# Patient Record
Sex: FEMALE | Race: OTHER | NOT HISPANIC OR LATINO | URBAN - METROPOLITAN AREA
[De-identification: names, ages, dates, MRNs, and addresses within clinical notes are randomized per-mention and may not be internally consistent; named-entity substitution may affect disease eponyms.]

---

## 2022-10-03 ENCOUNTER — OUTPATIENT (OUTPATIENT)
Dept: OUTPATIENT SERVICES | Facility: HOSPITAL | Age: 77
LOS: 1 days | End: 2022-10-03

## 2022-10-03 ENCOUNTER — APPOINTMENT (OUTPATIENT)
Dept: OPHTHALMOLOGY | Facility: CLINIC | Age: 77
End: 2022-10-03

## 2022-10-04 DIAGNOSIS — H26.40 UNSPECIFIED SECONDARY CATARACT: ICD-10-CM

## 2022-10-04 DIAGNOSIS — H18.609 KERATOCONUS, UNSPECIFIED, UNSPECIFIED EYE: ICD-10-CM

## 2023-01-10 PROBLEM — Z00.00 ENCOUNTER FOR PREVENTIVE HEALTH EXAMINATION: Status: ACTIVE | Noted: 2023-01-10

## 2023-01-18 NOTE — ASU PATIENT PROFILE, ADULT - NSICDXPASTMEDICALHX_GEN_ALL_CORE_FT
PAST MEDICAL HISTORY:  GERD (gastroesophageal reflux disease)     Hypothyroidism     Osteoporosis

## 2023-01-19 ENCOUNTER — TRANSCRIPTION ENCOUNTER (OUTPATIENT)
Age: 78
End: 2023-01-19

## 2023-01-19 ENCOUNTER — OUTPATIENT (OUTPATIENT)
Dept: OUTPATIENT SERVICES | Facility: HOSPITAL | Age: 78
LOS: 1 days | Discharge: HOME | End: 2023-01-19

## 2023-01-19 VITALS
OXYGEN SATURATION: 96 % | HEART RATE: 59 BPM | DIASTOLIC BLOOD PRESSURE: 63 MMHG | TEMPERATURE: 97 F | RESPIRATION RATE: 14 BRPM | HEIGHT: 55 IN | SYSTOLIC BLOOD PRESSURE: 120 MMHG | WEIGHT: 104.72 LBS

## 2023-01-19 VITALS
HEART RATE: 55 BPM | DIASTOLIC BLOOD PRESSURE: 72 MMHG | SYSTOLIC BLOOD PRESSURE: 153 MMHG | TEMPERATURE: 98 F | RESPIRATION RATE: 16 BRPM | OXYGEN SATURATION: 98 %

## 2023-01-19 DEVICE — IMPLANTABLE DEVICE
Type: IMPLANTABLE DEVICE | Site: LEFT | Status: NON-FUNCTIONAL
Removed: 2023-01-19

## 2023-01-19 RX ORDER — ACETAMINOPHEN 500 MG
650 TABLET ORAL ONCE
Refills: 0 | Status: DISCONTINUED | OUTPATIENT
Start: 2023-01-19 | End: 2023-01-19

## 2023-01-19 RX ORDER — OFLOXACIN 0.3 %
1 DROPS OPHTHALMIC (EYE)
Refills: 0 | Status: COMPLETED | OUTPATIENT
Start: 2023-01-19 | End: 2023-01-19

## 2023-01-19 RX ORDER — TROPICAMIDE 1 %
1 DROPS OPHTHALMIC (EYE)
Refills: 0 | Status: COMPLETED | OUTPATIENT
Start: 2023-01-19 | End: 2023-01-19

## 2023-01-19 RX ORDER — KETOROLAC TROMETHAMINE 0.5 %
1 DROPS OPHTHALMIC (EYE)
Refills: 0 | Status: COMPLETED | OUTPATIENT
Start: 2023-01-19 | End: 2023-01-19

## 2023-01-19 RX ORDER — SODIUM CHLORIDE 9 MG/ML
1000 INJECTION, SOLUTION INTRAVENOUS
Refills: 0 | Status: DISCONTINUED | OUTPATIENT
Start: 2023-01-19 | End: 2023-01-19

## 2023-01-19 RX ORDER — PHENYLEPHRINE HCL 2.5 %
1 DROPS OPHTHALMIC (EYE)
Refills: 0 | Status: COMPLETED | OUTPATIENT
Start: 2023-01-19 | End: 2023-01-19

## 2023-01-19 RX ORDER — ATROPINE SULFATE 1 %
1 DROPS OPHTHALMIC (EYE)
Refills: 0 | Status: COMPLETED | OUTPATIENT
Start: 2023-01-19 | End: 2023-01-19

## 2023-01-19 RX ADMIN — Medication 1 DROP(S): at 12:30

## 2023-01-19 RX ADMIN — Medication 1 DROP(S): at 12:25

## 2023-01-19 RX ADMIN — Medication 1 DROP(S): at 12:35

## 2023-01-19 NOTE — OPERATIVE REPORT - OPERATIVE RPOSRT DETAILS
PRE-OP DIAGNOSIS: Cataract__left____ EYE    POST-OP DIAGNOSIS: SAME    ANESTHESIA: MAC    PROCEDURE: Cataract____left__ EYE    SPECIMEN/TISSUE REMOVED: None    ESTIMATED BLOOD LOSS: < 1mL    COMPLICATIONS: None    The patient was brought into the operating room, where an intravenous line was inserted.  The patient was then prepped and draped in the usual sterile fashion for eye surgery of the operated eye.  The lid speculum was inserted into the operated eye.     A paracentesis was performed using a fifteen degree blade.  One percent preservative free lidocaine was injected into the anterior chamber.  Trypan blue was injected into the anterior chamber and irrigated out with balanced salt solution.  The anterior chamber was filled with Viscoat. A 2.75 mm keratome was used to make a clear corneal incision.  The cytotome and Utrata forceps were used to make a continuous curvilinear capsulorrhexis.  The lens was hydrodissected and hydrodelineated with balanced salt solution, until it was freely movable.   The phacoemulsification handpiece was used to groove the lens nucleus into four quadrants, which were then removed.  The remaining cortex was removed using irrigation and aspiration.  The anterior chamber and capsular bag were filled with Healon, and the posterior capsule was noted to be clear and intact.    An Melecio lens model SA60AT power ____18.0__ was injected into the capsular bag without complications.  The lens was centered with a Sinsky hook. The remaining Healon was removed with irrigation and aspiration on the viscoelastic setting.  Miochol was injected into the anterior chamber to constrict the pupil and pull the iris from the corneal wounds.  The anterior chamber was maintained with balanced salt solution and the corneal wounds were hydrated, and noted to be tight and secure.  The lid speculum was removed from the eye.  The patient received topical Vigamox and pred forte eye drops.  The patient also received Tobradex eye ointment, and the eye was patched and shielded.  The patient was brought to the recovery room in stable condition, alert and oriented times three.  The patient was reminded to follow up in the office the next day.

## 2023-01-19 NOTE — ASU DISCHARGE PLAN (ADULT/PEDIATRIC) - NS MD DC FALL RISK RISK
For information on Fall & Injury Prevention, visit: [email or web address] OR  [email or web address] OR  https://www.cdc.gov/steadi/patient.html

## 2023-01-24 RX ORDER — PHENYLEPHRINE HCL 2.5 %
1 DROPS OPHTHALMIC (EYE)
Refills: 0 | Status: DISCONTINUED | OUTPATIENT
Start: 2023-01-26 | End: 2023-01-26

## 2023-01-24 RX ORDER — KETOROLAC TROMETHAMINE 0.5 %
1 DROPS OPHTHALMIC (EYE)
Refills: 0 | Status: DISCONTINUED | OUTPATIENT
Start: 2023-01-26 | End: 2023-01-26

## 2023-01-24 RX ORDER — TROPICAMIDE 1 %
1 DROPS OPHTHALMIC (EYE)
Refills: 0 | Status: DISCONTINUED | OUTPATIENT
Start: 2023-01-26 | End: 2023-01-26

## 2023-01-24 RX ORDER — ATROPINE SULFATE 1 %
1 DROPS OPHTHALMIC (EYE)
Refills: 0 | Status: DISCONTINUED | OUTPATIENT
Start: 2023-01-26 | End: 2023-01-26

## 2023-01-24 RX ORDER — OFLOXACIN 0.3 %
1 DROPS OPHTHALMIC (EYE)
Refills: 0 | Status: DISCONTINUED | OUTPATIENT
Start: 2023-01-26 | End: 2023-01-26

## 2023-01-25 NOTE — ASU PATIENT PROFILE, ADULT - AS SC BRADEN NUTRITION
Post Operative Day #: 1    SUBJECTIVE: 50y F s/p laparoscopic inguinal hernia with mesh 7/9    INTERVAL HPI/OVERNIGHT EVENTS:    States feels well. States has mild but tolerable abdominal pain. mild nausea no vomiting  No other complaints.     MEDICATIONS  (STANDING):  heparin   Injectable 5000 Unit(s) SubCutaneous every 8 hours    MEDICATIONS  (PRN):  acetaminophen   Tablet .. 650 milliGRAM(s) Oral every 6 hours PRN Temp greater or equal to 38C (100.4F), Mild Pain (1 - 3)  acetaminophen   Tablet .. 650 milliGRAM(s) Oral every 6 hours PRN Mild Pain (1 - 3)  ketorolac   Injectable 30 milliGRAM(s) IV Push every 6 hours PRN Moderate Pain (4 - 6)  morphine  - Injectable 2 milliGRAM(s) IV Push every 6 hours PRN Severe Pain (7 - 10)  ondansetron Injectable 4 milliGRAM(s) IV Push every 6 hours PRN Nausea      OBJECTIVE:  Vital Signs Last 24 Hrs  T(C): 36.9 (10 Jul 2020 06:12), Max: 37 (10 Jul 2020 01:25)  T(F): 98.5 (10 Jul 2020 06:12), Max: 98.6 (10 Jul 2020 01:25)  HR: 80 (10 Jul 2020 07:44) (70 - 87)  BP: 136/83 (10 Jul 2020 07:44) (122/78 - 148/86)  BP(mean): 93 (10 Jul 2020 02:10) (89 - 96)  RR: 16 (10 Jul 2020 06:12) (12 - 18)  SpO2: 100% (10 Jul 2020 06:12) (96% - 100%)    Physical Exam:    Gen: awake, alert oriented NAD  HEENT: anicteric  Abdomen: surgical wounds dressed c/d/i, mildly distended, soft, incisional tenderness  Extremities: warm to touch no c/c/e            I&O's Detail    09 Jul 2020 07:01  -  10 Jul 2020 07:00  --------------------------------------------------------  IN:    Lactated Ringers IV Bolus: 2100 mL    lactated ringers.: 150 mL  Total IN: 2250 mL    OUT:    Estimated Blood Loss: 20 mL    Voided: 200 mL  Total OUT: 220 mL    Total NET: 2030 mL          Labs:                          12.7   9.30  )-----------( 186      ( 09 Jul 2020 12:40 )             39.1     07-09    140  |  106  |  7   ----------------------------<  87  4.7   |  26  |  0.59    Ca    8.6      09 Jul 2020 12:40    TPro  7.8  /  Alb  3.9  /  TBili  0.4  /  DBili  x   /  AST  15  /  ALT  18  /  AlkPhos  54  07-09    PT/INR - ( 09 Jul 2020 20:44 )   PT: 12.9 sec;   INR: 1.11 ratio         PTT - ( 09 Jul 2020 20:44 )  PTT:29.3 sec
(4) excellent

## 2023-01-26 ENCOUNTER — TRANSCRIPTION ENCOUNTER (OUTPATIENT)
Age: 78
End: 2023-01-26

## 2023-01-26 ENCOUNTER — OUTPATIENT (OUTPATIENT)
Dept: OUTPATIENT SERVICES | Facility: HOSPITAL | Age: 78
LOS: 1 days | Discharge: HOME | End: 2023-01-26

## 2023-01-26 VITALS
DIASTOLIC BLOOD PRESSURE: 65 MMHG | SYSTOLIC BLOOD PRESSURE: 142 MMHG | RESPIRATION RATE: 17 BRPM | HEART RATE: 52 BPM | TEMPERATURE: 98 F | OXYGEN SATURATION: 98 %

## 2023-01-26 VITALS
SYSTOLIC BLOOD PRESSURE: 118 MMHG | DIASTOLIC BLOOD PRESSURE: 67 MMHG | HEART RATE: 61 BPM | HEIGHT: 55 IN | RESPIRATION RATE: 16 BRPM | OXYGEN SATURATION: 97 % | TEMPERATURE: 98 F | WEIGHT: 101.41 LBS

## 2023-01-26 RX ORDER — ACETAMINOPHEN 500 MG
650 TABLET ORAL ONCE
Refills: 0 | Status: DISCONTINUED | OUTPATIENT
Start: 2023-01-26 | End: 2023-01-26

## 2023-01-26 RX ORDER — SODIUM CHLORIDE 9 MG/ML
1000 INJECTION, SOLUTION INTRAVENOUS
Refills: 0 | Status: DISCONTINUED | OUTPATIENT
Start: 2023-01-26 | End: 2023-01-26

## 2023-01-26 RX ORDER — ONDANSETRON 8 MG/1
4 TABLET, FILM COATED ORAL ONCE
Refills: 0 | Status: DISCONTINUED | OUTPATIENT
Start: 2023-01-26 | End: 2023-01-26

## 2023-01-26 RX ADMIN — Medication 1 DROP(S): at 14:02

## 2023-01-26 RX ADMIN — Medication 1 DROP(S): at 14:15

## 2023-01-26 RX ADMIN — Medication 1 DROP(S): at 14:00

## 2023-01-26 NOTE — ASU DISCHARGE PLAN (ADULT/PEDIATRIC) - NS MD DC FALL RISK RISK
For information on Fall & Injury Prevention, visit: [email or web address] OR  [email or web address] OR  https://www.cdc.gov/steadi/patient.html

## 2023-06-08 ENCOUNTER — APPOINTMENT (OUTPATIENT)
Dept: OPHTHALMOLOGY | Facility: CLINIC | Age: 78
End: 2023-06-08

## 2023-06-08 ENCOUNTER — OUTPATIENT (OUTPATIENT)
Dept: OUTPATIENT SERVICES | Facility: HOSPITAL | Age: 78
LOS: 1 days | End: 2023-06-08

## 2023-06-13 DIAGNOSIS — H26.491 OTHER SECONDARY CATARACT, RIGHT EYE: ICD-10-CM

## 2023-06-26 NOTE — OPERATIVE REPORT - OPERATIVE RPOSRT DETAILS
PRE-OP DIAGNOSIS: Cataract_right___ EYE    POST-OP DIAGNOSIS: SAME    ANESTHESIA: MAC    PROCEDURE: Cataract___right  EYE    SPECIMEN/TISSUE REMOVED: None    ESTIMATED BLOOD LOSS: < 1mL    COMPLICATIONS: None    The patient was brought into the operating room, where an intravenous line was inserted.  The patient was then prepped and draped in the usual sterile fashion for eye surgery of the operated eye.  The lid speculum was inserted into the operated eye.     A paracentesis was performed using a fifteen degree blade.  One percent preservative free lidocaine was injected into the anterior chamber.   Trypan blue was injected into the anterior chamber and irrigated out with balanced salt solution.  The anterior chamber was filled with Viscoat. A 2.75 mm keratome was used to make a clear corneal incision.  The cytotome and Utrata forceps were used to make a continuous curvilinear capsulorrhexis.  The lens was hydrodissected and hydrodelineated with balanced salt solution, until it was freely movable.   The phacoemulsification handpiece was used to groove the lens nucleus into four quadrants, which were then removed.  The remaining cortex was removed using irrigation and aspiration.  The anterior chamber and capsular bag were filled with Healon, and the posterior capsule was noted to be clear and intact.    An Melecio lens model SA60AT power ____18.5__ was injected into the capsular bag without complications.  The lens was centered with a Sinsky hook. The remaining Healon was removed with irrigation and aspiration on the viscoelastic setting.  Miochol was injected into the anterior chamber to constrict the pupil and pull the iris from the corneal wounds.  The anterior chamber was maintained with balanced salt solution and the corneal wounds were hydrated, and noted to be tight and secure.  The lid speculum was removed from the eye.  The patient received topical Vigamox and pred forte eye drops.  The patient also received Tobradex eye ointment, and the eye was patched and shielded.  The patient was brought to the recovery room in stable condition, alert and oriented times three.  The patient was reminded to follow up in the office the next day.                   stated

## 2023-07-26 ENCOUNTER — OUTPATIENT (OUTPATIENT)
Dept: OUTPATIENT SERVICES | Facility: HOSPITAL | Age: 78
LOS: 1 days | End: 2023-07-26

## 2023-07-26 ENCOUNTER — APPOINTMENT (OUTPATIENT)
Dept: OPHTHALMOLOGY | Facility: CLINIC | Age: 78
End: 2023-07-26

## 2023-07-31 DIAGNOSIS — H26.492 OTHER SECONDARY CATARACT, LEFT EYE: ICD-10-CM

## 2023-09-01 NOTE — ASU PATIENT PROFILE, ADULT - ABLE TO REACH PT
Voice message left on the patient's phone,: Arrival time is at 11:30 am , procedure is at  13:30 pm , must remain NPO/NO solid foods  after 12Mn tonight, allow to drink water or apple juice till 5-6 am , if takes medication for blood pressure , thyroid or seizures, it is ok to have it on am with sip of water, No medication for Diabetes.  dress comfortable, leave all valuable things at home. Bring Id photo , insurance and vaccination cards. escort arranged with a person of 18 years old or older. address and telephone  given to patient to call as needed./no individual instruction

## 2024-04-02 ENCOUNTER — APPOINTMENT (OUTPATIENT)
Dept: NEUROLOGY | Facility: CLINIC | Age: 79
End: 2024-04-02
Payer: MEDICARE

## 2024-04-02 VITALS
OXYGEN SATURATION: 97 % | WEIGHT: 98 LBS | HEIGHT: 56 IN | HEART RATE: 66 BPM | SYSTOLIC BLOOD PRESSURE: 123 MMHG | DIASTOLIC BLOOD PRESSURE: 74 MMHG | TEMPERATURE: 98 F | BODY MASS INDEX: 22.04 KG/M2

## 2024-04-02 DIAGNOSIS — Z78.9 OTHER SPECIFIED HEALTH STATUS: ICD-10-CM

## 2024-04-02 DIAGNOSIS — R29.6 REPEATED FALLS: ICD-10-CM

## 2024-04-02 DIAGNOSIS — R41.3 OTHER AMNESIA: ICD-10-CM

## 2024-04-02 PROCEDURE — G2211 COMPLEX E/M VISIT ADD ON: CPT

## 2024-04-02 PROCEDURE — 99205 OFFICE O/P NEW HI 60 MIN: CPT

## 2024-04-02 RX ORDER — LEVOTHYROXINE SODIUM 0.07 MG/1
75 TABLET ORAL DAILY
Refills: 0 | Status: ACTIVE | COMMUNITY

## 2024-04-02 RX ORDER — CHOLECALCIFEROL (VITAMIN D3) 25 MCG
TABLET ORAL
Refills: 0 | Status: ACTIVE | COMMUNITY

## 2024-04-02 RX ORDER — FAMOTIDINE 40 MG/1
40 TABLET, FILM COATED ORAL DAILY
Refills: 0 | Status: ACTIVE | COMMUNITY

## 2024-04-02 RX ORDER — CALCIUM CARBONATE/VITAMIN D3 250-3.125
TABLET ORAL
Refills: 0 | Status: ACTIVE | COMMUNITY

## 2024-04-02 RX ORDER — PROPYLENE GLYCOL 0.06 MG/ML
0.6 EMULSION OPHTHALMIC
Refills: 0 | Status: ACTIVE | COMMUNITY

## 2024-04-02 RX ORDER — DICLOFENAC SODIUM 10 MG/G
1 GEL TOPICAL DAILY
Refills: 0 | Status: ACTIVE | COMMUNITY

## 2024-04-02 RX ORDER — FLUTICASONE PROPIONATE 50 UG/1
50 SPRAY, METERED NASAL
Refills: 0 | Status: ACTIVE | COMMUNITY

## 2024-04-02 RX ORDER — DENOSUMAB 60 MG/ML
60 INJECTION SUBCUTANEOUS
Refills: 0 | Status: ACTIVE | COMMUNITY

## 2024-04-16 ENCOUNTER — APPOINTMENT (OUTPATIENT)
Dept: MRI IMAGING | Facility: HOSPITAL | Age: 79
End: 2024-04-16

## 2024-04-16 ENCOUNTER — OUTPATIENT (OUTPATIENT)
Dept: OUTPATIENT SERVICES | Facility: HOSPITAL | Age: 79
LOS: 1 days | End: 2024-04-16
Payer: MEDICARE

## 2024-04-16 PROCEDURE — 70551 MRI BRAIN STEM W/O DYE: CPT | Mod: 26

## 2024-04-16 PROCEDURE — 70551 MRI BRAIN STEM W/O DYE: CPT

## 2024-08-12 ENCOUNTER — NON-APPOINTMENT (OUTPATIENT)
Age: 79
End: 2024-08-12

## 2024-08-13 ENCOUNTER — NON-APPOINTMENT (OUTPATIENT)
Age: 79
End: 2024-08-13

## 2024-08-13 ENCOUNTER — APPOINTMENT (OUTPATIENT)
Dept: NEUROLOGY | Facility: CLINIC | Age: 79
End: 2024-08-13
Payer: MEDICARE

## 2024-08-13 VITALS
TEMPERATURE: 97 F | HEART RATE: 66 BPM | DIASTOLIC BLOOD PRESSURE: 72 MMHG | SYSTOLIC BLOOD PRESSURE: 119 MMHG | HEIGHT: 56 IN | BODY MASS INDEX: 22.72 KG/M2 | WEIGHT: 101 LBS | OXYGEN SATURATION: 98 %

## 2024-08-13 DIAGNOSIS — R26.89 OTHER ABNORMALITIES OF GAIT AND MOBILITY: ICD-10-CM

## 2024-08-13 PROCEDURE — G2211 COMPLEX E/M VISIT ADD ON: CPT

## 2024-08-13 PROCEDURE — 99213 OFFICE O/P EST LOW 20 MIN: CPT

## 2024-08-13 RX ORDER — CYCLOSPORINE 0.5 MG/ML
0.05 EMULSION OPHTHALMIC
Refills: 0 | Status: ACTIVE | COMMUNITY

## 2024-12-06 ENCOUNTER — APPOINTMENT (OUTPATIENT)
Dept: NEUROLOGY | Facility: CLINIC | Age: 79
End: 2024-12-06
Payer: MEDICARE

## 2024-12-06 PROCEDURE — 96132 NRPSYC TST EVAL PHYS/QHP 1ST: CPT

## 2024-12-06 PROCEDURE — 96138 PSYCL/NRPSYC TECH 1ST: CPT

## 2024-12-06 PROCEDURE — 96139 PSYCL/NRPSYC TST TECH EA: CPT

## 2024-12-06 PROCEDURE — 96133 NRPSYC TST EVAL PHYS/QHP EA: CPT

## 2024-12-06 PROCEDURE — 96116 NUBHVL XM PHYS/QHP 1ST HR: CPT

## 2024-12-19 ENCOUNTER — APPOINTMENT (OUTPATIENT)
Dept: NEUROLOGY | Facility: CLINIC | Age: 79
End: 2024-12-19
Payer: MEDICARE

## 2024-12-19 VITALS
BODY MASS INDEX: 23.39 KG/M2 | OXYGEN SATURATION: 96 % | DIASTOLIC BLOOD PRESSURE: 71 MMHG | TEMPERATURE: 97.7 F | HEIGHT: 56 IN | HEART RATE: 69 BPM | SYSTOLIC BLOOD PRESSURE: 107 MMHG | WEIGHT: 104 LBS

## 2024-12-19 DIAGNOSIS — F41.9 ANXIETY DISORDER, UNSPECIFIED: ICD-10-CM

## 2024-12-19 DIAGNOSIS — F32.A ANXIETY DISORDER, UNSPECIFIED: ICD-10-CM

## 2024-12-19 PROCEDURE — G2211 COMPLEX E/M VISIT ADD ON: CPT

## 2024-12-19 PROCEDURE — 99214 OFFICE O/P EST MOD 30 MIN: CPT

## 2024-12-19 RX ORDER — ESCITALOPRAM OXALATE 5 MG/1
5 TABLET ORAL
Qty: 30 | Refills: 4 | Status: ACTIVE | COMMUNITY
Start: 2024-12-19 | End: 1900-01-01

## 2024-12-20 LAB
TSH SERPL-ACNC: 4.18 UIU/ML
VIT B12 SERPL-MCNC: >2000 PG/ML

## 2025-06-17 ENCOUNTER — NON-APPOINTMENT (OUTPATIENT)
Age: 80
End: 2025-06-17

## 2025-06-17 ENCOUNTER — APPOINTMENT (OUTPATIENT)
Dept: NEUROLOGY | Facility: CLINIC | Age: 80
End: 2025-06-17
Payer: MEDICARE

## 2025-06-17 VITALS
SYSTOLIC BLOOD PRESSURE: 115 MMHG | WEIGHT: 106 LBS | HEIGHT: 56 IN | BODY MASS INDEX: 23.84 KG/M2 | HEART RATE: 60 BPM | DIASTOLIC BLOOD PRESSURE: 71 MMHG | TEMPERATURE: 96.3 F | OXYGEN SATURATION: 96 %

## 2025-06-17 PROCEDURE — G2211 COMPLEX E/M VISIT ADD ON: CPT

## 2025-06-17 PROCEDURE — 99214 OFFICE O/P EST MOD 30 MIN: CPT

## (undated) DEVICE — KNIFE ALCON STANDARD FULL HANDLE 15 DEG (PINK)

## (undated) DEVICE — GLV 6 PROTEXIS (WHITE)

## (undated) DEVICE — KNIFE FULL HANDLE ANGLE 2.75MM

## (undated) DEVICE — PACK CENTURION 2.75MM

## (undated) DEVICE — CAPSULE GUARD I/A

## (undated) DEVICE — PACK ANTERIOR SEGMENT

## (undated) DEVICE — KIT CENTURION ANTERIOR

## (undated) DEVICE — SOL IRR BAL SALT 500ML

## (undated) DEVICE — SUT NYLON 10-0 12" CU-5

## (undated) DEVICE — WARMING BLANKET LOWER ADULT

## (undated) DEVICE — DRAPE MICROSCOPE KNOB COVER SMALL (2 PCS)

## (undated) DEVICE — KNIFE ALCON MVR V-LANCE 20G (WHITE)